# Patient Record
Sex: FEMALE | Race: WHITE
[De-identification: names, ages, dates, MRNs, and addresses within clinical notes are randomized per-mention and may not be internally consistent; named-entity substitution may affect disease eponyms.]

---

## 2018-06-19 ENCOUNTER — HOSPITAL ENCOUNTER (OUTPATIENT)
Dept: HOSPITAL 17 - PHSDC | Age: 53
Discharge: HOME | End: 2018-06-19
Attending: OTOLARYNGOLOGY
Payer: COMMERCIAL

## 2018-06-19 VITALS
DIASTOLIC BLOOD PRESSURE: 72 MMHG | SYSTOLIC BLOOD PRESSURE: 118 MMHG | OXYGEN SATURATION: 97 % | RESPIRATION RATE: 14 BRPM | HEART RATE: 76 BPM

## 2018-06-19 VITALS — BODY MASS INDEX: 23.05 KG/M2 | WEIGHT: 130.07 LBS | HEIGHT: 63 IN

## 2018-06-19 VITALS — TEMPERATURE: 97.5 F

## 2018-06-19 VITALS — HEART RATE: 70 BPM

## 2018-06-19 DIAGNOSIS — J34.2: Primary | ICD-10-CM

## 2018-06-19 DIAGNOSIS — J34.3: ICD-10-CM

## 2018-06-19 DIAGNOSIS — I10: ICD-10-CM

## 2018-06-19 PROCEDURE — 30465 REPAIR NASAL STENOSIS: CPT

## 2018-06-19 PROCEDURE — 93005 ELECTROCARDIOGRAM TRACING: CPT

## 2018-06-19 PROCEDURE — 21336 OPEN TX SEPTAL FX W/WO STABJ: CPT

## 2018-06-19 PROCEDURE — 30140 RESECT INFERIOR TURBINATE: CPT

## 2018-06-19 PROCEDURE — 00160 ANES PX NOSE&SINUS NOS: CPT

## 2018-06-19 NOTE — EKG
Date Performed: 06/19/2018       Time Performed: 06:59:33

 

PTAGE:      52 years

 

EKG:      Sinus rhythm 

 

 LOW QRS VOLTAGE IN PRECORDIAL LEADS INCOMPLETE RIGHT BUNDLE BRANCH BLOCK BORDERLINE ECG

 

PREVIOUS TRACING       : 09/18/2016 00.40 Since the previous tracing, no significant change noted

 

DOCTOR:   Jorge Galvan  Interpretating Date/Time  06/19/2018 16:57:16

## 2018-06-24 NOTE — MP
cc:

Mark Bishop MD

****

 

 

DATE OF OPERATION:

06/19/2018

 

DATE OF OPERATION:

06/19/2018.

 

SURGEON:

Dr. Mark Bishop MD

 

PREOPERATIVE DIAGNOSES:

1.  Nasal airway obstruction.

2.  Bilateral anterior nasal stenosis.

3.  Nasal septal deviation.

4.  Hypertrophied inferior turbinates.

 

POSTOPERATIVE DIAGNOSES:

1.  Nasal airway obstruction.

2.  Bilateral anterior nasal stenosis.

3.  Nasal septal deviation.

4.  Hypertrophied inferior turbinates.

 

OPERATION PERFORMED:

1.  Open repair of bilateral anterior nasal stenosis.

2.  Open repair, nasal septal fracture.

3.  Bilateral submucosal resection of inferior turbinates.

 

INDICATIONS:

Documented in history and physical.

 

DETAILS OF PROCEDURE:

The patient was taken to OR #2 and placed in the supine position.  

Following induction of general anesthesia and intubation, the nose is 

packed bilaterally with cotton pledgets saturated in 0.05% 

oxymetazoline.  These remained in place while the nose was injected 

into the columella, the nasal tip and dorsum, nasal vestibules, as 

well as septal mucosa and inferior turbinates with a total of 14 mL of

1% Xylocaine with epinephrine 1:100,000.  She was then prepped and 

draped for surgery.  A W-plasty incision was made on the inferior 

surface of the columella.  This was extended down into the soft tissue

using sharp dissection down to the inferior borders of the medial 

crura of the lower lateral cartilages.  The incision was then extended

within the nasal vestibule following along the inferior borders of 

these cartilages, allowing the skin to be elevated from the columella,

the nasal tip and the nasal dorsum.  Dissection continued superiorly 

and laterally until the cartilaginous nasal skeleton was exposed.  

Sharp dissection continued between the medial crura of lower lateral 

cartilages veering toward the left side where the anterior end of the 

quadrangular cartilage was displaced into the left nasal vestibule. 

When the antrum was encountered, the soft tissue was elevated from the

quadrangular cartilage on both sides using sharp and Jorje elevator 

dissection.  This was carried back as far as the junction of the bony 

and cartilaginous septum.  This gave an end on view of the 

quadrangular cartilage, which showed evidence of old long healed 

fracture.  It was sharply displaced to the left anteriorly along the 

angulated line of fracture approximately 1 cm posterior to the 

anterior nasal spine.  Posterior to this point, it was deviated over 

toward the right.  The entire anterior end was mobilized and then a 

cumulative area of 2 x 2 cm was removed, preserving 1.5 cm dorsal and 

caudal cartilaginous struts.  This allowed the anterior end to return 

easily to the midline.

 

Next, the mucosa was elevated from the bony septum and the maxillary 

crest and these bones were then removed using Terrell-Do forceps

on the septum and a 6 mm Irene chisel on the maxillary crest.  

This was completed, preserving the anterior nasal spine.  When this 

was completed, the anterior cartilaginous strut was then sewn down to 

the soft tissue investing the anterior nasal spine in the midline 

using a single suture of 5-0 clear nylon. Reconstruction of the 

columella was then completed incorporating a 3 x 15 mm segment of the 

quadrangular cartilage held in place with interrupted sutures of 5-0 

clear nylon between the medial crura. There were several areas of 

breakage in the medial crura just below the domes into the mid point 

of the domes.  These were repaired using this same suture with a 

horizontal mattress technique at each site of cartilaginous 

separation.  When this was completed, the inferior turbinates were 

addressed.  They were fractured out medially and stab incision was 

opened along their inferior surfaces.  Through these incisions, the 

submucosal soft tissue was reduced using a curette and preserving the 

conchal bone.  The incisions were then cauterized using suction Bovie 

at 35 holt and the remnants of the inferior turbinates were then 

relateralized to the lateral nasal wall.  When this was completed, the

skin was then redraped over the nasal tip and the W-plasty incision 

was closed using interrupted sutures of 5-0 fast-absorbing plain gut. 

Additional sutures were placed into the medial wall of the nasal 

vestibules bilaterally.  The nose was then packed with Merocel tampons

coated in bacitracin ointment and the procedure was terminated.  The 

patient was reversed from anesthesia and taken to recovery in good 

condition.  There were no complications.  Blood loss was 80 mL.

 

 

__________________________________

MD ANIBAL Hernandez/CARLITOS

D: 06/24/2018, 11:24 AM

T: 06/24/2018, 01:04 PM

Visit #: [id number]

Job #: [id number]